# Patient Record
(demographics unavailable — no encounter records)

---

## 2025-03-12 NOTE — DISCUSSION/SUMMARY
[de-identified] : This patient presents today for initial consultation regarding left elbow pain.  Physical exam and x-rays with evidence of lateral epicondylitis of left elbow.  He has been taking anti-inflammatories and modifying activities without improvement.  Due to the patient's continued symptoms I recommended performed a steroid injection to the lateral epicondyle.  Instructions are given postinjection for ice algesia to modification of activities.  I will see the patient back in the office if he has recurrence of symptoms.  At least 30 minutes was spent performing the evaluation and management on today's office visit.  This includes but is not limited to preparing to see patient including review of any test results or outside medical records, obtaining and/or reviewing separately obtained history, performing examination and evaluation, counseling and educating the patient on their diagnosis and treatment recommendations, ordering medications, tests, or procedures, documenting clinical information in the electronic health record, independently interpreting results (not separately reported) and communicating results to the patient, and coordination of care.

## 2025-03-12 NOTE — PHYSICAL EXAM
[de-identified] : The patient appears well nourished  and in no apparent distress.  The patient is alert and oriented to person, place, and time.   Affect and mood appear normal.    The head is normocephalic and atraumatic.  The eyes reveal normal sclera and extra ocular muscles are intact.   The neck appears normal with no jugular venous distention or masses noted.   Skin shows normal turgor with no evidence of eczema or psoriasis.  No respiratory distress noted.  The patient ambulates with a normal gait.  The  left elbow has full range of motion in flexion/extension, as well as pronation/supination.  There is tenderness to palpation about the lateral epicondyle.  There is pain with resisted extension of the middle finger.  There is mild soft tissue swelling over the lateral epicondyle.  There is no soft tissue swelling.  There is no warmth or erythema.  There is no instability to varus or valgus stress.  Elbow strength is normal.  Strength and sensation are intact distally.    Pulses and capillary refill are normal.    No clubbing, cyanosis, or edema noted.  No lymphadenopathy noted.  [de-identified] : AP,  lateral, and oblique views of the left elbow were obtained.  There is no evidence of fracture, subluxation, or dislocation of the elbow.  The joint spaces are well maintained without evidence of degenerative arthritis.  No osteochondral lesions are noted.

## 2025-03-12 NOTE — PROCEDURE
[de-identified] : Using sterile technique, 0.75cc of depomedrol (40mg/ml) and 0.75cc of 1% plain lidocaine was drawn up into a sterile 3cc syringe.  A sterile 25 gauge needle was then affixed to the syringe. The left lateral epicondylar area was then sterilely prepped with chlorhexidine and ethylene chloride spray was used as an anesthetic prior to injection.  The depomedrol/lidocaine mixture was injected just distal to the epicondyle.  The patient tolerated the procedure well without difficulty.  The patient was given instructions on the use of ice and anti-inflammatories post injection site soreness.

## 2025-03-12 NOTE — HISTORY OF PRESENT ILLNESS
[de-identified] : This patient presents today complaining of a 1 month history of left elbow pain.  Pain level 4 out of 10.  Patient is swelling and weakness.  Pain is localized to the lateral aspect the elbow.  Pain worsens reaching pulling pushing and lifting.  Pain is also worse with grabbing objects.  Pain is improved with rest.  He takes Aleve with slight improvement.  Denies radicular symptoms or numbness and tingling.